# Patient Record
Sex: FEMALE | Race: WHITE | NOT HISPANIC OR LATINO | Employment: OTHER | URBAN - NONMETROPOLITAN AREA
[De-identification: names, ages, dates, MRNs, and addresses within clinical notes are randomized per-mention and may not be internally consistent; named-entity substitution may affect disease eponyms.]

---

## 2018-07-25 ENCOUNTER — OFFICE VISIT CONVERTED (OUTPATIENT)
Dept: FAMILY MEDICINE CLINIC | Age: 33
End: 2018-07-25
Attending: NURSE PRACTITIONER

## 2019-11-05 ENCOUNTER — OFFICE VISIT CONVERTED (OUTPATIENT)
Dept: FAMILY MEDICINE CLINIC | Age: 34
End: 2019-11-05
Attending: FAMILY MEDICINE

## 2021-03-18 ENCOUNTER — IMMUNIZATION (OUTPATIENT)
Dept: VACCINE CLINIC | Facility: HOSPITAL | Age: 36
End: 2021-03-18

## 2021-03-18 PROCEDURE — 0001A: CPT | Performed by: INTERNAL MEDICINE

## 2021-03-18 PROCEDURE — 91300 HC SARSCOV02 VAC 30MCG/0.3ML IM: CPT | Performed by: INTERNAL MEDICINE

## 2021-04-08 ENCOUNTER — IMMUNIZATION (OUTPATIENT)
Dept: VACCINE CLINIC | Facility: HOSPITAL | Age: 36
End: 2021-04-08

## 2021-04-08 PROCEDURE — 0002A: CPT | Performed by: INTERNAL MEDICINE

## 2021-04-08 PROCEDURE — 91300 HC SARSCOV02 VAC 30MCG/0.3ML IM: CPT | Performed by: INTERNAL MEDICINE

## 2021-05-18 NOTE — PROGRESS NOTES
SteffanieElena ANJEL 1985     Office/Outpatient Visit    Visit Date: Tue, Nov 5, 2019 11:04 am    Provider: Sudhir Boateng MD (Assistant: Faby Riley MA)    Location: [facility]        Electronically signed by Sudhir Boateng MD on  11/05/2019 11:38:49 AM                             SUBJECTIVE:        CC: physical exam         HPI:     Elena is in today for wellness exam.  She is 34 years old and lives with her mother, Cathleen.  She has Down Syndrome and participates in [employer] on a regular basis.  She enjoys it.  She goes to [employer] on Thursday evenings.  She enjoys that as well.  Elena remains in good overall health.  There are not really any concerns today.  She needed an updated exam for [employer].     ROS:     CONSTITUTIONAL:  Negative for chills and fever.      EYES:  Negative for blurred vision and eye pain.      E/N/T:  Negative for nasal congestion and sore throat.      CARDIOVASCULAR:  Negative for chest pain and palpitations.      RESPIRATORY:  Negative for recent cough and dyspnea.      GASTROINTESTINAL:  Negative for abdominal pain, nausea and vomiting.      MUSCULOSKELETAL:  Positive for arthralgias (R shoulder).   Negative for myalgias.      INTEGUMENTARY/BREAST:  Negative for atypical mole(s) and rash.      NEUROLOGICAL:  Negative for paresthesias and weakness.      PSYCHIATRIC:  Negative for anxiety and depression.          PMH/FMH/SH:     Last Reviewed on 11/05/2019 11:28 AM by Sudhir Boateng    Past Medical History:                 PAST MEDICAL HISTORY         Down's Syndrome         GYNECOLOGICAL HISTORY:    G0    No problems with menstrual cycles. Sexually Active? No;         Surgical History:     NONE         Family History:         Positive for Arrhythmia ( father ) and Coronary Artery Disease ( mother; mat. GM; pat. uncle ).  Father: Arrhythmia     Mother: Hypertension; Hyperlipidemia; Myocardial Infarction ( age 55 )     Brother(s): 3 brother(s) total;   Hypertension     Paternal Grandfather:  at age 65;  Alcoholism     Paternal Grandmother: Healthy     Maternal Grandfather: Myocardial Infarction     Maternal Grandmother:  at age 86;  Myocardial Infarction;  Cerebrovascular Accident         Social History:         Household:  Lives with her parents.  Occupation: [employer]     Marital Status: Single     Children: None         Tobacco/Alcohol/Supplements:     Last Reviewed on 2019 11:28 AM by Sudhir Boateng    Tobacco: She has never smoked.          Substance Abuse History:     Last Reviewed on 2019 11:28 AM by Sudhir Boateng    NEGATIVE         Mental Health History:     Last Reviewed on 2019 11:28 AM by Sudhir Boateng        Communicable Diseases (eg STDs):     Last Reviewed on 2019 11:28 AM by Sudhir Boateng            Current Problems:     Last Reviewed on 2019 11:28 AM by Sudhir Boateng    Allergies         Immunizations:     Afluria (pf) 10/29/2019     Flulaval 2007         Allergies:     Last Reviewed on 2019 11:28 AM by Sudhir Boateng      No Known Drug Allergies.         Current Medications:     Last Reviewed on 2019 11:28 AM by Sudhir Boateng    Zyrtec 10mg Tablet 1 tab daily prn         OBJECTIVE:        Vitals:         Current: 2019 11:10:59 AM    Ht:  4 ft, 11 in;  Wt: 181.4 lbs;  BMI: 36.6    T: 97.3 F (oral);  BP: 120/61 mm Hg (right arm, sitting);  P: 72 bpm (right arm (BP Cuff), sitting);  sCr: 0.78 mg/dL;  GFR: 104.30        Exams:     PHYSICAL EXAM:     GENERAL: vital signs recorded - well developed, well nourished;  no apparent distress;     EYES: extraocular movements intact; conjunctiva and cornea are normal; PERRL;     E/N/T:  normal EACs, TMs, nasal/oral mucosa, teeth, gingiva, and oropharynx;     NECK: range of motion is normal; thyroid is non-palpable;     RESPIRATORY: normal respiratory rate and pattern with no distress;  normal breath sounds with no rales, rhonchi, wheezes or rubs;     CARDIOVASCULAR: normal rate; rhythm is regular;  no systolic murmur; no edema;     GASTROINTESTINAL: nontender; normal bowel sounds; no organomegaly;     LYMPHATIC: no enlargement of cervical or facial nodes; no supraclavicular nodes;     BREAST/INTEGUMENT: SKIN: no significant rashes or lesions; no suspicious moles;     NEUROLOGIC: mental status: alert and oriented x 3; cranial nerves II-XII grossly intact;     PSYCHIATRIC: appropriate affect and demeanor; normal psychomotor function;         ASSESSMENT           V70.0   Z00.00  Health checkup              DDx:         ORDERS:         Other Orders:         Depression screen negative  (In-House)                   PLAN:          Health checkup         RECOMMENDATIONS given include: Elena seems well today.  We have reviewed her care.  I do not see a reason for blood work or other testing at this time.  Her labs last year looked good and her thyroid not palpable.  She has also lost some weight in the interim.  Overall, no changes will be recommended.  She will return as needed.  Things seem to be going well..  MIPS PHQ-9 Depression Screening: Completed form scanned and in chart; Total Score 0; Negative Depression Screen           Orders:         Depression screen negative  (In-House)             Patient Education Handouts:       Physical Exam 30-39 year, Female              CHARGE CAPTURE           **Please note: ICD descriptions below are intended for billing purposes only and may not represent clinical diagnoses**        Primary Diagnosis:         V70.0 Health checkup            Z00.00    Encounter for general adult medical examination without abnormal findings              Orders:          54858   Preventive medicine, established patient, age 18-39 years  (In-House)                Depression screen negative  (In-House)               ADDENDUMS:      ____________________________________     Addendum: 11/05/2019 11:40 AM - Four, Team         Visit Note Faxed to:        [facility]; Number [phone]

## 2021-07-01 VITALS
DIASTOLIC BLOOD PRESSURE: 61 MMHG | TEMPERATURE: 97.3 F | HEIGHT: 59 IN | BODY MASS INDEX: 36.57 KG/M2 | SYSTOLIC BLOOD PRESSURE: 120 MMHG | HEART RATE: 72 BPM | WEIGHT: 181.4 LBS

## 2021-07-01 VITALS
DIASTOLIC BLOOD PRESSURE: 72 MMHG | WEIGHT: 191 LBS | SYSTOLIC BLOOD PRESSURE: 106 MMHG | HEIGHT: 59 IN | HEART RATE: 78 BPM | TEMPERATURE: 97.1 F | BODY MASS INDEX: 38.51 KG/M2

## 2021-09-13 ENCOUNTER — OFFICE VISIT (OUTPATIENT)
Dept: FAMILY MEDICINE CLINIC | Age: 36
End: 2021-09-13

## 2021-09-13 VITALS
SYSTOLIC BLOOD PRESSURE: 112 MMHG | HEIGHT: 59 IN | DIASTOLIC BLOOD PRESSURE: 75 MMHG | WEIGHT: 190 LBS | BODY MASS INDEX: 38.3 KG/M2 | HEART RATE: 68 BPM

## 2021-09-13 DIAGNOSIS — Z82.49 FAMILY HISTORY OF CORONARY ARTERIOSCLEROSIS: ICD-10-CM

## 2021-09-13 DIAGNOSIS — Z00.00 ROUTINE GENERAL MEDICAL EXAMINATION AT A HEALTH CARE FACILITY: Primary | ICD-10-CM

## 2021-09-13 PROCEDURE — G0402 INITIAL PREVENTIVE EXAM: HCPCS | Performed by: NURSE PRACTITIONER

## 2021-09-13 NOTE — ASSESSMENT & PLAN NOTE
Advise regular exercise, healthy eating, always wear seat belts. Living will, fall prevention discussed.  Immunizations discussed.   To continue yearly optometry and dental exams.    Advise flu vaccine and tetanus /mom is working on her estate plan and will do a living will then.  Cleared for special [employer]    Advised monthly BSE

## 2021-09-13 NOTE — PROGRESS NOTES
The ABCs of the Annual Wellness Visit  Initial Medicare Wellness Visit    Chief Complaint   Patient presents with   • Medicare Wellness-Initial Visit     Subjective   History of Present Illness:  Elena Valiente is a 36 y.o. female who presents for an Initial Medicare Wellness Visit.    Elena is a pt of Dr Boateng, last visit here was 2019.  She has down's syndrome. G0, never had surgery.  Lives with her mother and father. Was going  to [employer] , not since covid   Here with her mom Cathleen  She is competing in special [employer] and needs a form completed   Medicare wellness HPI  Exercises regularly: no   Eats healthy: tries   Last mammogram:N/A    Last pap smear:never had /not sexually active   BSE:no  Wears seatbelts:yes   Living will: no   Optometrist:Dr marky jackson   Dentist:Dr patricia   Alcohol intake:none   Drugs:none   Falls:none   Colonoscopy: N/A  Immunizations:she has had her covid vaccines.  Over ten years since last tetanus.     The following portions of the patient's history were reviewed and   updated as appropriate: allergies, current medications, past family history, past medical history, past social history and past surgical history.     Compared to one year ago, the patient feels her physical   health is the same.    Compared to one year ago, the patient feels her mental   health is the same.    Recent Hospitalizations:  She was not admitted to the hospital during the last year.       Current Medical Providers:  Patient Care Team:  Akiko Villeda APRN as PCP - General (Family Medicine)    No outpatient medications prior to visit.     No facility-administered medications prior to visit.       No opioid medication identified on active medication list. I have reviewed chart for other potential  high risk medication/s and harmful drug interactions in the elderly.          Aspirin is not on active medication list.  Aspirin use is not indicated based on review of current medical  "condition/s. Risk of harm outweighs potential benefits.  .    Patient Active Problem List   Diagnosis   • Routine general medical examination at a health care facility   • Family history of coronary arteriosclerosis     Advance Care Planning  Advance Directive is not on file.  ACP discussion was held with the patient during this visit. Patient does not have an advance directive, information provided.    Review of Systems   Constitutional: Negative for fatigue and fever.   HENT: Negative for congestion.    Eyes: Negative for visual disturbance.   Respiratory: Negative for cough and shortness of breath.    Cardiovascular: Negative for chest pain, palpitations and leg swelling.   Gastrointestinal: Negative for abdominal pain.   Genitourinary: Negative for dysuria.   Musculoskeletal: Negative for arthralgias.   Skin: Negative for rash (chronic rash, saw derm this am ).   Psychiatric/Behavioral: The patient is not nervous/anxious.         Objective       Vitals:    09/13/21 1457   BP: 112/75   BP Location: Right arm   Patient Position: Sitting   Pulse: 68   Weight: 86.2 kg (190 lb)   Height: 149.9 cm (59\")     Body mass index is 38.38 kg/m².  BMI has not been calculated during today's encounter.     Does the patient have evidence of cognitive impairment? No    Physical Exam  Vitals reviewed.   Constitutional:       Appearance: Normal appearance. She is well-developed.   HENT:      Head: Normocephalic and atraumatic.      Right Ear: External ear normal.      Left Ear: External ear normal.      Mouth/Throat:      Pharynx: No oropharyngeal exudate.   Eyes:      Conjunctiva/sclera: Conjunctivae normal.      Pupils: Pupils are equal, round, and reactive to light.   Cardiovascular:      Rate and Rhythm: Normal rate and regular rhythm.      Pulses:           Radial pulses are 2+ on the right side and 2+ on the left side.      Heart sounds: No murmur heard.   No friction rub. No gallop.    Pulmonary:      Effort: Pulmonary " effort is normal.      Breath sounds: Normal breath sounds. No wheezing or rhonchi.   Chest:      Breasts:         Right: Normal. No mass or nipple discharge.         Left: Normal. No mass or nipple discharge.   Abdominal:      General: Bowel sounds are normal. There is no distension.      Palpations: Abdomen is soft. There is no hepatomegaly or splenomegaly.      Tenderness: There is no abdominal tenderness.   Lymphadenopathy:      Upper Body:      Right upper body: No axillary adenopathy.      Left upper body: No axillary adenopathy.   Skin:     General: Skin is warm and dry.   Neurological:      Mental Status: She is alert and oriented to person, place, and time.      Cranial Nerves: No cranial nerve deficit.   Psychiatric:         Mood and Affect: Mood and affect normal.         Behavior: Behavior normal.         Thought Content: Thought content normal.         Judgment: Judgment normal.       Lab Results   Component Value Date    TRIG 49 09/22/2021    HDL 47 09/22/2021    LDL 90 09/22/2021    VLDL 11 09/22/2021          HEALTH RISK ASSESSMENT    Smoking Status:  Social History     Tobacco Use   Smoking Status Never Smoker   Smokeless Tobacco Never Used     Alcohol Consumption:  Social History     Substance and Sexual Activity   Alcohol Use Never     Fall Risk Screen:    [facility] not [facility].    Depression Screen:   PHQ-2/PHQ-9 Depression Screening 9/13/2021   Little interest or pleasure in doing things 0   Feeling down, depressed, or hopeless 0   Total Score 0       Health Habits and Functional and Cognitive Screening:  Functional & Cognitive Status 9/13/2021   Do you have difficulty preparing food and eating? No   Do you have difficulty bathing yourself, getting dressed or grooming yourself? No   Do you have difficulty using the toilet? No   Do you have difficulty moving around from place to place? No   Do you have trouble with steps or getting out of a bed or a chair? No    Current Diet Other   Dental Exam Up to date   Eye Exam Up to date   Exercise (times per week) 0 times per week   Current Exercises Include No Regular Exercise   Do you need help using the phone?  No   Are you deaf or do you have serious difficulty hearing?  No   Do you need help with transportation? Yes   Do you need help shopping? Yes   Do you need help preparing meals?  No   Do you need help with housework?  No   Do you need help with laundry? No   Do you need help taking your medications? No   Do you need help managing money? No   Do you ever drive or ride in a car without wearing a seat belt? No   Have you felt unusual stress, anger or loneliness in the last month? No   Who do you live with? Other   If you need help, do you have trouble finding someone available to you? No   Have you been bothered in the last four weeks by sexual problems? No   Do you have difficulty concentrating, remembering or making decisions? No       Age-appropriate Screening Schedule:  Refer to the list below for future screening recommendations based on patient's age, sex and/or medical conditions. Orders for these recommended tests are listed in the plan section. The patient has been provided with a written plan.    Health Maintenance   Topic Date Due   • TDAP/TD VACCINES (2 - Tdap) 08/07/2013   • PAP SMEAR  Never done   • INFLUENZA VACCINE  10/01/2021            Assessment/Plan   CMS Preventative Services Quick Reference  Risk Factors Identified During Encounter  Immunizations Discussed/Encouraged (specific Immunizations; Tdap and Influenza  The above risks/problems have been discussed with the patient.  Follow up actions/plans if indicated are seen below in the Assessment/Plan Section.  Pertinent information has been shared with the patient in the After Visit Summary.    Diagnoses and all orders for this visit:    1. Routine general medical examination at a health care facility (Primary)  Assessment & Plan:  Advise regular exercise,  healthy eating, always wear seat belts. Living will, fall prevention discussed.  Immunizations discussed.   To continue yearly optometry and dental exams.    Advise flu vaccine and tetanus /mom is working on her estate plan and will do a living will then.  Cleared for special [employer]    Advised monthly BSE       2. Family history of coronary arteriosclerosis  -     Comprehensive Metabolic Panel; Future  -     Lipid Panel; Future      Follow Up:  Return for fasting for labs .     An After Visit Summary and PPPS were made available to the patient.

## 2021-09-22 ENCOUNTER — LAB (OUTPATIENT)
Dept: LAB | Facility: HOSPITAL | Age: 36
End: 2021-09-22

## 2021-09-22 DIAGNOSIS — Z82.49 FAMILY HISTORY OF CORONARY ARTERIOSCLEROSIS: ICD-10-CM

## 2021-09-22 LAB
ALBUMIN SERPL-MCNC: 3.7 G/DL (ref 3.5–5.2)
ALBUMIN/GLOB SERPL: 1 G/DL
ALP SERPL-CCNC: 71 U/L (ref 39–117)
ALT SERPL W P-5'-P-CCNC: 12 U/L (ref 1–33)
ANION GAP SERPL CALCULATED.3IONS-SCNC: 7.2 MMOL/L (ref 5–15)
AST SERPL-CCNC: 22 U/L (ref 1–32)
BILIRUB SERPL-MCNC: 0.2 MG/DL (ref 0–1.2)
BUN SERPL-MCNC: 14 MG/DL (ref 6–20)
BUN/CREAT SERPL: 16.7 (ref 7–25)
CALCIUM SPEC-SCNC: 9.2 MG/DL (ref 8.6–10.5)
CHLORIDE SERPL-SCNC: 102 MMOL/L (ref 98–107)
CHOLEST SERPL-MCNC: 148 MG/DL (ref 0–200)
CO2 SERPL-SCNC: 27.8 MMOL/L (ref 22–29)
CREAT SERPL-MCNC: 0.84 MG/DL (ref 0.57–1)
GFR SERPL CREATININE-BSD FRML MDRD: 78 ML/MIN/1.73
GLOBULIN UR ELPH-MCNC: 3.6 GM/DL
GLUCOSE SERPL-MCNC: 85 MG/DL (ref 65–99)
HDLC SERPL-MCNC: 47 MG/DL (ref 40–60)
LDLC SERPL CALC-MCNC: 90 MG/DL (ref 0–100)
LDLC/HDLC SERPL: 1.94 {RATIO}
POTASSIUM SERPL-SCNC: 4.4 MMOL/L (ref 3.5–5.2)
PROT SERPL-MCNC: 7.3 G/DL (ref 6–8.5)
SODIUM SERPL-SCNC: 137 MMOL/L (ref 136–145)
TRIGL SERPL-MCNC: 49 MG/DL (ref 0–150)
VLDLC SERPL-MCNC: 11 MG/DL (ref 5–40)

## 2021-09-22 PROCEDURE — 80053 COMPREHEN METABOLIC PANEL: CPT

## 2021-09-22 PROCEDURE — 36415 COLL VENOUS BLD VENIPUNCTURE: CPT

## 2021-09-22 PROCEDURE — 80061 LIPID PANEL: CPT

## 2023-12-26 ENCOUNTER — OFFICE VISIT (OUTPATIENT)
Dept: PODIATRY | Facility: CLINIC | Age: 38
End: 2023-12-26
Payer: MEDICARE

## 2023-12-26 VITALS
SYSTOLIC BLOOD PRESSURE: 101 MMHG | TEMPERATURE: 98.2 F | BODY MASS INDEX: 39.31 KG/M2 | HEART RATE: 73 BPM | DIASTOLIC BLOOD PRESSURE: 70 MMHG | HEIGHT: 59 IN | OXYGEN SATURATION: 98 % | WEIGHT: 195 LBS

## 2023-12-26 DIAGNOSIS — Q90.9 DOWN'S SYNDROME: Primary | ICD-10-CM

## 2023-12-26 DIAGNOSIS — M20.11 VALGUS DEFORMITY OF BOTH GREAT TOES: ICD-10-CM

## 2023-12-26 DIAGNOSIS — M79.672 FOOT PAIN, BILATERAL: ICD-10-CM

## 2023-12-26 DIAGNOSIS — M79.671 FOOT PAIN, BILATERAL: ICD-10-CM

## 2023-12-26 DIAGNOSIS — M20.12 VALGUS DEFORMITY OF BOTH GREAT TOES: ICD-10-CM

## 2023-12-26 DIAGNOSIS — L74.4 ANHIDROSIS: ICD-10-CM

## 2023-12-26 RX ORDER — AMMONIUM LACTATE 12 G/100G
LOTION TOPICAL 2 TIMES DAILY
Qty: 225 G | Refills: 11 | Status: SHIPPED | OUTPATIENT
Start: 2023-12-26

## 2023-12-26 RX ORDER — KETOCONAZOLE 20 MG/ML
SHAMPOO TOPICAL
COMMUNITY
Start: 2023-12-12

## 2023-12-26 NOTE — PROGRESS NOTES
[facility] - PODIATRY    Today's Date: 12/26/23    Patient Name: Elena Valiente  MRN: [id number]  CSN: [id number]  PCP: Akiko Villeda APRN  Referring Provider: Referring, Self    SUBJECTIVE     Chief Complaint   Patient presents with    Left Foot - Pain, Establish Care    Right Foot - Callouses, Pain, Establish Care     HPI: Elena Valiente, a 38 y.o.female, presents to clinic with her mother.    New, Established, New Problem: New    Location: Bilateral bunion deformities    Duration: Developed over her lifetime    Onset: Insidious    Aggravating factors:  Patient relates pain is aggravated by shoe gear and ambulation.      Previous Treatment: Change in shoe gear    Patient denies any fevers, chills, nausea, vomiting, shortness of breath, nor any other constitutional signs nor symptoms.    New, Established, New Problem:  SECOND PROBLEM  Location: Bilateral feet  Duration:   Greater than 1 month  Onset: Insidious  Nature: Dry skin  Stable, worsening, improving:  Worsening  Aggravating factors:     Previous Treatment:  Slowly worsening despite using OTC skin lotion.    Past Medical History:   Diagnosis Date    Down's syndrome      History reviewed. No pertinent surgical history.  Family History   Problem Relation Age of Onset    Hypertension Mother     Heart disease Mother     Heart attack Mother     Heart disease Father     Heart disease Maternal Uncle     Hypertension Maternal Grandmother     Stroke Maternal Grandmother     Hypertension Maternal Grandfather      Social History     Socioeconomic History    Marital status: Single   Tobacco Use    Smoking status: Never    Smokeless tobacco: Never   Vaping Use    Vaping Use: Never used   Substance and Sexual Activity    Alcohol use: Never    Drug use: Never    Sexual activity: Defer     No Known Allergies  Current Outpatient Medications   Medication Sig Dispense Refill    ketoconazole (NIZORAL) 2 % shampoo APPLY SHAMPOO TO SCALP  "TWO TO THREE TIMES WEEKLY AS NEEDED. ALTERNATE WITH OTHER SHAMPOO.      ammonium lactate (AmLactin) 12 % lotion Apply  topically to the appropriate area as directed 2 (Two) Times a Day. 225 g 11     No current facility-administered medications for this visit.     Review of Systems   Constitutional: Negative.    Musculoskeletal:         Bilateral bunion deformities   Skin:         Dry skin b/l   All other systems reviewed and are negative.      OBJECTIVE     Vitals:    12/26/23 1505   BP: 101/70   Pulse: 73   Temp: 98.2 °F (36.8 °C)   SpO2: 98%       No results found for: \"WBC\", \"RBC\", \"HGB\", \"HCT\", \"MCV\", \"MCH\", \"MCHC\", \"RDW\", \"RDWSD\", \"MPV\", \"PLT\", \"NEUTRORELPCT\", \"LYMPHORELPCT\", \"MONORELPCT\", \"EOSRELPCT\", \"BASORELPCT\", \"AUTOIGPER\", \"NEUTROABS\", \"LYMPHSABS\", \"MONOSABS\", \"EOSABS\", \"BASOSABS\", \"AUTOIGNUM\", \"NRBC\"      Lab Results   Component Value Date    GLUCOSE 85 09/22/2021    BUN 14 09/22/2021    CREATININE 0.84 09/22/2021    BCR 16.7 09/22/2021    K 4.4 09/22/2021    CO2 27.8 09/22/2021    CALCIUM 9.2 09/22/2021    ALBUMIN 3.70 09/22/2021    BILITOT 0.2 09/22/2021    AST 22 09/22/2021    ALT 12 09/22/2021       Patient seen in no apparent distress.      PHYSICAL EXAM:     Foot/Ankle Exam    GENERAL  Appearance:  appears stated age  Orientation:  AAOx3  Affect:  appropriate  Gait:  unimpaired  Assistance:  independent  Right shoe gear: casual shoe  Left shoe gear: casual shoe    VASCULAR     Right Foot Vascularity   Normal vascular exam    Dorsalis pedis:  2+  Posterior tibial:  2+  Skin temperature:  warm  Edema grading:  None  CFT:  < 3 seconds  Pedal hair growth:  Present  Varicosities:  none     Left Foot Vascularity   Normal vascular exam    Dorsalis pedis:  2+  Posterior tibial:  2+  Skin temperature:  warm  Edema grading:  None  CFT:  < 3 seconds  Pedal hair growth:  Present  Varicosities:  none     NEUROLOGIC     Right Foot Neurologic   Normal sensation    Light touch sensation: normal  Vibratory " sensation: normal  Hot/Cold sensation: normal  Protective Sensation using [place] Monofilament:   Sites intact: 10  Sites tested: 10     Left Foot Neurologic   Normal sensation    Light touch sensation: normal  Vibratory sensation: normal  Hot/Cold sensation:  normal  Protective Sensation using [place] Monofilament:   Sites intact: 10  Sites tested: 10    MUSCULOSKELETAL     Right Foot Musculoskeletal   Hallux valgus: Yes       Left Foot Musculoskeletal   Hallux valgus: Yes      MUSCLE STRENGTH     Right Foot Muscle Strength   Foot dorsiflexion:  4  Foot plantar flexion:  4  Foot inversion:  4  Foot eversion:  4     Left Foot Muscle Strength   Foot dorsiflexion:  4  Foot plantar flexion:  4  Foot inversion:  4  Foot eversion:  4    RANGE OF MOTION     Right Foot Range of Motion   Foot and ankle ROM within normal limits       Left Foot Range of Motion   Foot and ankle ROM within normal limits      DERMATOLOGIC      Right Foot Dermatologic   Skin  Positive for dryness.      Left Foot Dermatologic   Skin  Positive for dryness.       RADIOLOGY:      XR Foot 3+ View Right    Result Date: 12/26/2023  Narrative: IN-OFFICE IMAGING:  Standing, weightbearing, 3 view, AP, MO, Lateral, Right foot Indication:  Foot Pain Findings: Significant medial deviation of the first metatarsal with associated lateral deviation of the hallux at the metatarsal phalangeal joint.  No periosteal reactions nor osteolytic changes seen.  No occult fractures seen. Comparison: No comparison views available.     XR Foot 3+ View Left    Result Date: 12/26/2023  Narrative: IN-OFFICE IMAGING:  Standing, weightbearing, 3 view, AP, MO, Lateral, Left foot Indication:  Foot Pain Findings: Significant medial deviation of the first metatarsal with associated lateral deviation of the hallux at the metatarsal phalangeal joint.  No periosteal reactions nor osteolytic changes seen.  No occult fractures seen. Comparison: No comparison views  available.     ASSESSMENT/PLAN     Diagnoses and all orders for this visit:    1. Down's syndrome (Primary)    2. Valgus deformity of both great toes    3. Foot pain, bilateral    4. Anhidrosis  -     ammonium lactate (AmLactin) 12 % lotion; Apply  topically to the appropriate area as directed 2 (Two) Times a Day.  Dispense: 225 g; Refill: 11        Comprehensive lower extremity examination and evaluation was performed.    Discussed findings and treatment plan including risks, benefits, and treatment options with patient in detail. Patient agreed with treatment plan.    Medications and allergies reviewed.  Reviewed available lab values along with other pertinent labs.  These were discussed with the patient.    Discussed proper shoegear for the patient's feet and medical condition.  Patient states understanding and agreement with this plan.    I do not recommend surgical correction of patient's bilateral bunion deformities.    Patient is to monitor for recurrence and any new symptoms and to contact Dr. Stokes's office for a follow-up appointment.      The patient states understanding and agreement with this plan.    The patient and her mother state understanding and agreement with all plans and instructions.    An After Visit Summary was printed and given to the patient at discharge, including (if requested) any available informative/educational handouts regarding diagnosis, treatment, or medications. All questions were answered to patient/family satisfaction. Should symptoms fail to improve or worsen they agree to call or return to clinic or to go to the Emergency Department. Discussed the importance of following up with any needed screening tests/labs/specialist appointments and any requested follow-up recommended by me today. Importance of maintaining follow-up discussed and patient accepts that missed appointments can delay diagnosis and potentially lead to worsening of conditions.    Return if symptoms worsen  or fail to improve., or sooner if acute issues arise.    This document has been electronically signed by Emir Stokes DPM on December 26, 2023 15:51 EST

## 2023-12-27 ENCOUNTER — PATIENT ROUNDING (BHMG ONLY) (OUTPATIENT)
Dept: PODIATRY | Facility: CLINIC | Age: 38
End: 2023-12-27
Payer: MEDICARE

## 2023-12-27 NOTE — PROGRESS NOTES
A [employer] message has been sent to the patient for PATIENT ROUNDING with [facility]

## 2024-11-20 ENCOUNTER — CLINICAL SUPPORT (OUTPATIENT)
Dept: FAMILY MEDICINE CLINIC | Age: 39
End: 2024-11-20
Payer: MEDICARE

## 2024-11-20 DIAGNOSIS — Z23 IMMUNIZATION DUE: Primary | ICD-10-CM

## 2024-11-20 PROCEDURE — 90480 ADMN SARSCOV2 VAC 1/ONLY CMP: CPT | Performed by: FAMILY MEDICINE

## 2024-11-20 PROCEDURE — 90656 IIV3 VACC NO PRSV 0.5 ML IM: CPT | Performed by: FAMILY MEDICINE

## 2024-11-20 PROCEDURE — G0008 ADMIN INFLUENZA VIRUS VAC: HCPCS | Performed by: FAMILY MEDICINE

## 2024-11-20 PROCEDURE — 91320 SARSCV2 VAC 30MCG TRS-SUC IM: CPT | Performed by: FAMILY MEDICINE

## 2025-02-13 ENCOUNTER — TELEPHONE (OUTPATIENT)
Dept: FAMILY MEDICINE CLINIC | Age: 40
End: 2025-02-13
Payer: MEDICARE

## 2025-02-13 NOTE — TELEPHONE ENCOUNTER
Priscilla Khan left message on my voicemail re; [address] for she faxed over on this pt today. She requested a call back at [phone]

## 2025-02-14 NOTE — TELEPHONE ENCOUNTER
Called Priscilla and told her LAI Villeda has not seen this pt for almost 4 years.   She said okay, she will contact her caregiver and find out who she sees.